# Patient Record
Sex: OTHER/UNKNOWN | URBAN - METROPOLITAN AREA
[De-identification: names, ages, dates, MRNs, and addresses within clinical notes are randomized per-mention and may not be internally consistent; named-entity substitution may affect disease eponyms.]

---

## 2023-05-30 ENCOUNTER — TELEPHONE (OUTPATIENT)
Dept: HEMATOLOGY | Age: 58
End: 2023-05-30

## 2023-05-30 NOTE — TELEPHONE ENCOUNTER
Patient was instructed that this is a new patient consult ONLY! I explained to the patient that they will come in our office and meet with Dr. Miquel Johnson first and then from there they will get a date as to when their colonoscopy will be scheduled. The patient was also instructed that they DO NOT have to do any prep the day of the consult.  The patient confirmed and is scheduled for his New patient appt on 06/29/23 [facility] 12:45 pm, directions to the office were also reviewed with the patient  Electronically signed by Mirian Lan MA on 5/30/2023 at 12:41 PM

## 2023-05-30 NOTE — TELEPHONE ENCOUNTER
called patient and left them a voicemail along with our office number for patient to call and schedule a new patient appt with dr Cara Campbell, referral from [facility]  Electronically signed by Junaid Cuevas MA on 5/30/2023 at 10:36 AM

## 2023-06-30 ENCOUNTER — TELEPHONE (OUTPATIENT)
Dept: HEMATOLOGY | Age: 58
End: 2023-06-30

## 2023-07-03 ENCOUNTER — TELEPHONE (OUTPATIENT)
Dept: HEMATOLOGY | Age: 58
End: 2023-07-03

## 2023-07-03 NOTE — TELEPHONE ENCOUNTER
I tried to call the patient this morning and reschedule his no show appt he had with our office on 06/30/2023, but both phone numbers we have listed are currently out of service  Electronically signed by Taylor Bowens MA on 7/3/2023 at 11:00 AM